# Patient Record
Sex: FEMALE | Race: BLACK OR AFRICAN AMERICAN | NOT HISPANIC OR LATINO | Employment: UNEMPLOYED | URBAN - METROPOLITAN AREA
[De-identification: names, ages, dates, MRNs, and addresses within clinical notes are randomized per-mention and may not be internally consistent; named-entity substitution may affect disease eponyms.]

---

## 2021-10-08 ENCOUNTER — MEDICAL CORRESPONDENCE (OUTPATIENT)
Dept: HEALTH INFORMATION MANAGEMENT | Facility: CLINIC | Age: 31
End: 2021-10-08

## 2021-10-08 ENCOUNTER — TRANSFERRED RECORDS (OUTPATIENT)
Dept: HEALTH INFORMATION MANAGEMENT | Facility: CLINIC | Age: 31
End: 2021-10-08

## 2021-10-08 ENCOUNTER — TELEPHONE (OUTPATIENT)
Dept: OPHTHALMOLOGY | Facility: CLINIC | Age: 31
End: 2021-10-08

## 2021-10-08 NOTE — TELEPHONE ENCOUNTER
Dr. Gloria Hurd at [facility] referring to Uveitis specialist    Records in care everywhere.    Spoke to referring provider and will ask patient communicator to reach out for scheduling with Dr. Monk or Dr. Quiroz in next 1-2 weeks and referring provider satisfied with plan    (first available with Dr. Monk next Tuesday)    Pt will need updated demographics-- address/telephone and likely insurance.    Note to patient communicator to assist in updating/scheduling later in day today    Best number to reach pt [phone]    Mik Howell RN 7:52 AM 10/08/21

## 2021-10-08 NOTE — TELEPHONE ENCOUNTER
Called and spoke with Joelle today.  I scheduled her for an Uveitis appt with Dr. Monk for 10/12 @ 1230 pm I verified and changed Joelle's address and sent her a new pt packet.     Sari Bates Communication Facilitator on 10/8/2021 at 10:29 AM

## 2021-10-12 ENCOUNTER — OFFICE VISIT (OUTPATIENT)
Dept: OPHTHALMOLOGY | Facility: CLINIC | Age: 31
End: 2021-10-12
Attending: OPHTHALMOLOGY
Payer: COMMERCIAL

## 2021-10-12 DIAGNOSIS — H21.233 PIGMENT DISPERSION SYNDROME OF BOTH EYES: ICD-10-CM

## 2021-10-12 DIAGNOSIS — H20.00 ACUTE ANTERIOR UVEITIS OF BOTH EYES: Primary | ICD-10-CM

## 2021-10-12 PROCEDURE — G0463 HOSPITAL OUTPT CLINIC VISIT: HCPCS

## 2021-10-12 PROCEDURE — 99204 OFFICE O/P NEW MOD 45 MIN: CPT | Performed by: OPHTHALMOLOGY

## 2021-10-12 RX ORDER — CYCLOBENZAPRINE HCL 10 MG
TABLET ORAL PRN
COMMUNITY
Start: 2020-11-23

## 2021-10-12 RX ORDER — PREDNISOLONE ACETATE 10 MG/ML
1 SUSPENSION/ DROPS OPHTHALMIC
COMMUNITY
Start: 2021-09-20

## 2021-10-12 RX ORDER — ATROPINE SULFATE 10 MG/ML
1 SOLUTION/ DROPS OPHTHALMIC AT BEDTIME
COMMUNITY
Start: 2021-08-10

## 2021-10-12 ASSESSMENT — VISUAL ACUITY
OS_SC+: -2
OD_SC: 20/20
METHOD: SNELLEN - LINEAR
OS_SC: 20/20

## 2021-10-12 ASSESSMENT — CONF VISUAL FIELD
OS_NORMAL: 1
OD_NORMAL: 1
METHOD: COUNTING FINGERS

## 2021-10-12 ASSESSMENT — TONOMETRY
OS_IOP_MMHG: 15
IOP_METHOD: APPLANATION
OD_IOP_MMHG: 12
OD_IOP_MMHG: 15
OS_IOP_MMHG: 17
IOP_METHOD: TONOPEN

## 2021-10-12 ASSESSMENT — CUP TO DISC RATIO
OD_RATIO: 0.15
OS_RATIO: 0.15

## 2021-10-12 ASSESSMENT — SLIT LAMP EXAM - LIDS
COMMENTS: MAKEUP DEBRIS
COMMENTS: MAKEUP DEBRIS

## 2021-10-12 ASSESSMENT — EXTERNAL EXAM - RIGHT EYE: OD_EXAM: NORMAL

## 2021-10-12 ASSESSMENT — EXTERNAL EXAM - LEFT EYE: OS_EXAM: NORMAL

## 2021-10-12 NOTE — PROGRESS NOTES
"HPI:  Joelle Garcia is a 31 year old female presenting for uveitis evaluation.    Referred by Dr. Gloria Hurd (optom at [facility])  Reviewed notes from [facility].  7/1/21: visit with Dr. Ellis as new patient. Given 3 week course of Maxitrol for 2 months of itchy, watery eyes.  7/20/21: acute visit with Dr. Hurd for bilateral eye pain and redness starting that morning. Found to have 3+ AC cell and 1+ flare each eye and started on cyclo at bedtime and PF q1h  7/21/21: had started PF that morning and had 2 drops before appointment with 2+ AC cell right eye and 1+ left eye.   7/23/21: 1+ AC cell each eye --> tapered PF to q2h  7/30/21: quiet each eye --> stopped cyclo and tapered PF to QID  8/9/21: on PF QID noted to have \"few small cells with minimal movement\" each eye --> started atropine and increased PF to q2h  8/16/21: one cell right eye and few cells left eye --> continued on PF q2h  8/23/21: \"1+ fine cells only seen on high beam\" in each eye while on PF q2h --> referred to [facility]  9/20/21: follow up after seeing [facility] Dr. Salazar who stopped atropine and tapered PF to QID right eye; found to have 2+ AC cell right eye and 1+ left eye on PF QID right eye And q2h left eye but asymptomatic --> increased PF to q2h each eye and referred to rheum  9/23/21: visit with Dr. 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     Prior workup:  Normal/negative: BMP, CBC, RF, Treponemal Ab, LONNIE, HIV, CMV IgM, ANCA, TSH, CCP, Sjogren's Ab, B27, ACE, CXR  CRP 1.43 and ESR 30 in July 2021    She states she felt fine when she went to sleep but woke up at 6am to pray for Elizabeth. When she woke up she had bilateral eye pain and a lot of light sensitivity. Went in for same day appointment and " found to have inflammation in both eyes.  Symptoms resolved with steroid drops. Now eyes feel fine but she is told she still has some inflammation.  Using PF currently every 2 hours (8-9x/day during the week and about 7x/day on weekend)  Stopped dilating drop to be able to see at near.  She states she was told at her last appointment on 10/8 that she should restart the dilating drop but she didn't want to because of near focus.    Review Of Systems  Skin: no rashes, no oral/genital ulcers  Ears/Nose/Throat: no sore throat, no runny nose, no difficulty hearing, no tinnitus  Respiratory: no shortness of breath, no cough, no hemoptysis  Cardiovascular: no chest pain, no heart palpitations  Gastrointestinal: no nausea, no vomiting, no diarrhea, no blood/mucus in stool  Genitourinary: no pain with urination, no blood in urine  Musculoskeletal: no joint pains/swelling  Neurologic: no numbness, no tingling, no weakness, no headaches, no difficulty with speech/gait  Hematologic/Lymphatic/Immunologic: no easy bleeding/bruising  Endocrine: no weight loss; has gained 60 pounds over the past 2 years  Const: no fevers, no chills    Past Ocular History:  Bilateral iritis - acute onset 7/20/21    PMH:  +PPD in [place] when [place] from [facility] - had 9 months tx for LTBI    SH:  Just got new job as  at [employer], was previously working at a .  Born in [facility], moved to [place] in 2005, then to [place] in 2010, then to [place] in 2015, and back to [place] in 2019.  Never smoker. No EtOH, no illicits.  No recent travel.  Has 3 kids (14, 11, 10) who are all healthy. No pets.    FH:  No known family history of blindness, glaucoma, macular degeneration. No known FHx of autoimmune/inflammatory disease.    ASSESSMENT and PLAN:  1. Acute anterior uveitis of both eyes  - by history with acute anterior uveitis both eyes starting 7/20/21 with eye redness and photophobia that resolved with topical PF  - +PPD in 2005 upon  immigration [place] from [place]; treated with 9 months of LTBI therapy  - workup unremarkable including negative syphilis, ACE, CXR, B27  - ROS negative  - quiet on exam today while on PF q2h    -- discussed diagnosis of uveitis and that 50% of uveitis is idiopathic  -- discussed risk of longterm prednisolone and would like to taper off therapy    --> taper PF to QID x1 week, then TID x1 week, then BID until follow up  --> follow up in 3-4 weeks or sooner if symptoms recur    --> discussed if remains quiet with taper will plan to monitor without treatment; if unable to taper off drops or flaring >3x/year would discuss longterm IMT; she understands    Letter to Dr. Hurd       2. Pigment dispersion syndrome of both eyes  - fine pigment in anterior chamber of each eye without active inflammation  - no TIDs, no K spindle  - angles are open and densely pigmented especially inferiorly  - IOP OK and healthy nerves  - monitor      Follow up in 3-4 weeks no dilation, or sooner PRN        -----------------------------------------------------------------------------------    Attestation:  Complete documentation of historical and exam elements from today's encounter can be found in the full encounter summary report (not reduplicated in this progress note). I personally obtained the chief complaint(s) and history of present illness.  I confirmed and edited as necessary the review of systems, past medical/surgical history, family history, social history, and examination findings as documented by others; and I examined the patient myself. I personally reviewed the relevant tests, images, and reports as documented above.     I formulated and edited as necessary the assessment and plan and discussed the findings and management plan with the patient and family.      Kalyn Monk MD

## 2021-10-12 NOTE — PATIENT INSTRUCTIONS
OK to stay off the red top drop    Taper the prednisolone drop to 4 times a day for 1 week  Then to 3 times a day for 1 week  Then to 2 times a day    Follow up in 3-4 weeks    If the pain returns as you are decreasing the drops, call for a sooner appointment and continue taking the drops

## 2021-10-12 NOTE — NURSING NOTE
Chief Complaints and History of Present Illnesses   Patient presents with     Uveitis Evaluation     Chief Complaint(s) and History of Present Illness(es)     Uveitis Evaluation               Comments     Pt states vision has been good lately. Pt does not wear glasses or contacts.  No eye pain today. No flashes or floaters. No redness or dryness.    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    ADRIANO Aguirre October 12, 2021 12:31 PM

## 2021-11-02 ENCOUNTER — OFFICE VISIT (OUTPATIENT)
Dept: OPHTHALMOLOGY | Facility: CLINIC | Age: 31
End: 2021-11-02
Attending: OPHTHALMOLOGY
Payer: COMMERCIAL

## 2021-11-02 DIAGNOSIS — H20.00 ACUTE ANTERIOR UVEITIS OF BOTH EYES: Primary | ICD-10-CM

## 2021-11-02 DIAGNOSIS — H04.129 DRY EYE: ICD-10-CM

## 2021-11-02 DIAGNOSIS — H21.233 PIGMENT DISPERSION SYNDROME OF BOTH EYES: ICD-10-CM

## 2021-11-02 PROCEDURE — 99213 OFFICE O/P EST LOW 20 MIN: CPT | Performed by: OPHTHALMOLOGY

## 2021-11-02 PROCEDURE — G0463 HOSPITAL OUTPT CLINIC VISIT: HCPCS

## 2021-11-02 ASSESSMENT — SLIT LAMP EXAM - LIDS
COMMENTS: MAKEUP DEBRIS
COMMENTS: MAKEUP DEBRIS

## 2021-11-02 ASSESSMENT — CONF VISUAL FIELD
METHOD: COUNTING FINGERS
OD_NORMAL: 1
OS_NORMAL: 1

## 2021-11-02 ASSESSMENT — EXTERNAL EXAM - RIGHT EYE: OD_EXAM: NORMAL

## 2021-11-02 ASSESSMENT — TONOMETRY
IOP_METHOD: TONOPEN
OS_IOP_MMHG: 14
OD_IOP_MMHG: 13

## 2021-11-02 ASSESSMENT — VISUAL ACUITY
OD_SC+: -1
OS_PH_SC: 20/25
METHOD: SNELLEN - LINEAR
OS_SC: 20/30
OD_SC: 20/20

## 2021-11-02 ASSESSMENT — EXTERNAL EXAM - LEFT EYE: OS_EXAM: NORMAL

## 2021-11-02 NOTE — NURSING NOTE
Chief Complaints and History of Present Illnesses   Patient presents with     Uveitis Follow-Up     Acute anterior uveitis of both eyes     Chief Complaint(s) and History of Present Illness(es)     Uveitis Follow-Up     Laterality: both eyes    Course: stable    Associated symptoms: itching and burning.  Negative for eye pain    Treatments tried: eye drops    Pain scale: 0/10    Comments: Acute anterior uveitis of both eyes              Comments     She states that for the past 5 days both eyes have been itchy and burning.  When she scratches her eyes then they get red.  Her vision has seemed stable in both eyes since her last exam.    She is using PF daily in both eyes.    Yoana Marte, COT 2:18 PM  November 2, 2021

## 2021-11-02 NOTE — PROGRESS NOTES
HPI:  Joelle Garcia is a 31 year old female presenting for follow up of anterior uveitis.    Last visit 10/12/21 with quiet eyes with AC pigment on PF QID.  Tapered PF to TID then to BID.    Today, she reports she is down to once a day. Used QID for a week after last visit, then TID for 1 week, BID until yesterday, once today. Has also stopped dilating drop. No changes in vision. No eye pain. Some itchiness.  No rashes, no joint pains, no difficulty breathing.  No return of prior iritis symptoms.    Past Ocular History:  Bilateral iritis - acute onset 7/20/21    PMH:  +PPD in 2005 when moved to  from [facility] - had 9 months tx for LTBI    SH:  Just got new job as  at [employer], was previously working at a .  Born in [facility], moved to [place] in 2005, then to [place] in 2010, then to [place] in 2015, and back to [place] in 2019.  Never smoker. No EtOH, no illicits.  No recent travel.  Has 3 kids (14, 11, 10) who are all healthy. No pets.    FH:  No known family history of blindness, glaucoma, macular degeneration. No known FHx of autoimmune/inflammatory disease.    ASSESSMENT and PLAN:  1. Acute anterior uveitis of both eyes  - by history with acute anterior uveitis both eyes starting 7/20/21 with eye redness and photophobia that resolved with topical PF  - +PPD in 2005 upon immigration to  from [facility]; treated with 9 months of LTBI therapy  - workup unremarkable including negative syphilis, ACE, CXR, B27  - ROS negative  - quiet on exam today while on PF daily (decreased from BID yesterday)    --> taper PF to daily x1 week, then stop  --> follow up in 6 weeks or sooner if symptoms recur    --> discussed if remains quiet with taper will plan to monitor without treatment; if unable to taper off drops or flaring >3x/year would discuss longterm IMT; she understands      2. Pigment dispersion syndrome of both eyes  - fine pigment in anterior chamber of each eye without active inflammation  - no TIDs,  no K spindle  - angles are open and densely pigmented especially inferiorly  - IOP OK and healthy nerves  - monitor    3. Dry eye  - discussed ATs and warm compresses prn      Follow up in 6 weeks no dilation, or sooner PRN        -----------------------------------------------------------------------------------    Attestation:  Complete documentation of historical and exam elements from today's encounter can be found in the full encounter summary report (not reduplicated in this progress note). I personally obtained the chief complaint(s) and history of present illness.  I confirmed and edited as necessary the review of systems, past medical/surgical history, family history, social history, and examination findings as documented by others; and I examined the patient myself. I personally reviewed the relevant tests, images, and reports as documented above.     I formulated and edited as necessary the assessment and plan and discussed the findings and management plan with the patient and family.      Kalyn Monk MD

## 2021-11-02 NOTE — PATIENT INSTRUCTIONS
Continue prednisolone 1 time a day in both eyes for 1 more week, then stop    Start artificial tears as needed for dryness/itchiness    Follow up in 6 weeks or sooner if changes

## 2021-11-10 ENCOUNTER — TELEPHONE (OUTPATIENT)
Dept: OPHTHALMOLOGY | Facility: CLINIC | Age: 31
End: 2021-11-10
Payer: COMMERCIAL

## 2021-11-10 DIAGNOSIS — H04.129 DRY EYE: ICD-10-CM

## 2021-11-10 NOTE — TELEPHONE ENCOUNTER
Rx updated with comment to pharmacy Genteal, Systane, Refresh or equivalent ok to dispense and triage number for questions    Mik Howell RN 9:01 AM 11/10/21          [facility] Call Center    Phone Message    May a detailed message be left on voicemail: yes     Reason for Call: Medication Question or concern regarding medication   Prescription Clarification  Name of Medication: hydroxypropyl methylcellulose (GENTEAL) 0.2 % SOLN ophthalmic solution  Prescribing Provider: Dr ramsay   Pharmacy: [facility]   What on the order needs clarification? The pharmacist that they don't the ingredients needed for this prescription. Please call the pharmacist to discuss. Thanks.          Action Taken: Message routed to:  [facility]    Travel Screening: Not Applicable

## 2021-12-14 ENCOUNTER — OFFICE VISIT (OUTPATIENT)
Dept: OPHTHALMOLOGY | Facility: CLINIC | Age: 31
End: 2021-12-14
Attending: OPHTHALMOLOGY
Payer: COMMERCIAL

## 2021-12-14 DIAGNOSIS — H04.129 DRY EYE: ICD-10-CM

## 2021-12-14 DIAGNOSIS — H21.233 PIGMENT DISPERSION SYNDROME OF BOTH EYES: ICD-10-CM

## 2021-12-14 DIAGNOSIS — H20.00 ACUTE ANTERIOR UVEITIS OF BOTH EYES: Primary | ICD-10-CM

## 2021-12-14 PROCEDURE — G0463 HOSPITAL OUTPT CLINIC VISIT: HCPCS

## 2021-12-14 PROCEDURE — 99214 OFFICE O/P EST MOD 30 MIN: CPT | Performed by: OPHTHALMOLOGY

## 2021-12-14 ASSESSMENT — VISUAL ACUITY
OS_SC+: -2
OS_SC: 20/20
OD_SC: 20/20
METHOD: SNELLEN - LINEAR

## 2021-12-14 ASSESSMENT — EXTERNAL EXAM - RIGHT EYE: OD_EXAM: NORMAL

## 2021-12-14 ASSESSMENT — SLIT LAMP EXAM - LIDS
COMMENTS: MILD MGD
COMMENTS: MILD MGD

## 2021-12-14 ASSESSMENT — CONF VISUAL FIELD
OS_NORMAL: 1
OD_NORMAL: 1
METHOD: COUNTING FINGERS

## 2021-12-14 ASSESSMENT — TONOMETRY
OD_IOP_MMHG: 13
IOP_METHOD: TONOPEN
OS_IOP_MMHG: 14

## 2021-12-14 ASSESSMENT — EXTERNAL EXAM - LEFT EYE: OS_EXAM: NORMAL

## 2021-12-14 NOTE — PROGRESS NOTES
HPI:  Joelle Garcia is a 31 year old female presenting for follow up of anterior uveitis.    Last visit 11/2/21 with quiet eyes with AC pigment on PF daily.  Tapered off drops.    Today, eyes are back to normal. No issues with vision. No eye pain. No flashes/floaters. No rashes, fevers, difficulty breathing, joint pains. Itchy/dry eyes improved with prn ATs and WCs    Past Ocular History:  Bilateral iritis - acute onset 7/20/21    PMH:  +PPD in [place] when [place] from [facility] - had 9 months tx for LTBI    SH:  Just got new job as  at [employer], was previously working at a .  Born in [facility], moved to [place] in 2005, then to [place] in 2010, then to [place] in 2015, and back to [place] in 2019.  Never smoker. No EtOH, no illicits.  No recent travel.  Has 3 kids (14, 11, 10) who are all healthy. No pets.    FH:  No known family history of blindness, glaucoma, macular degeneration. No known FHx of autoimmune/inflammatory disease.    ASSESSMENT and PLAN:  1. Acute anterior uveitis of both eyes  - by history with acute anterior uveitis both eyes starting 7/20/21 with eye redness and photophobia that resolved with topical PF  - +PPD in 2005 upon immigration to  from [facility]; treated with 9 months of LTBI therapy  - workup unremarkable including negative syphilis, ACE, CXR, B27  - ROS negative  - quiet on exam today off drops for the past month    --> stay off PF; discussed to call if flaring and would restart drops if needed but monitor off treatment for now    --> discussed if remains quiet will plan to monitor without treatment; if unable to taper off drops or flaring >3x/year would discuss longterm IMT; she understands    Follow up in 3-4 months or sooner if inflammation flares      2. Pigment dispersion syndrome of both eyes  - fine pigment in anterior chamber of each eye without active inflammation  - no TIDs, no K spindle  - angles are open and densely pigmented especially inferiorly  - IOP OK  and healthy nerves  - monitor    3. Dry eye  - continue ATs and warm compresses prn      Follow up in 3-4 months no dilation, or sooner PRN        -----------------------------------------------------------------------------------    Attestation:  Complete documentation of historical and exam elements from today's encounter can be found in the full encounter summary report (not reduplicated in this progress note). I personally obtained the chief complaint(s) and history of present illness.  I confirmed and edited as necessary the review of systems, past medical/surgical history, family history, social history, and examination findings as documented by others; and I examined the patient myself. I personally reviewed the relevant tests, images, and reports as documented above.     I formulated and edited as necessary the assessment and plan and discussed the findings and management plan with the patient and family.      Kalyn Monk MD

## 2021-12-14 NOTE — NURSING NOTE
Chief Complaints and History of Present Illnesses   Patient presents with     Uveitis Follow-Up     6 week follow up Acute anterior uveitis of both eyes     Chief Complaint(s) and History of Present Illness(es)     Uveitis Follow-Up     Comments: 6 week follow up Acute anterior uveitis of both eyes              Comments     Pt states vision has improved since last visit. No eye pain today.  No flashes or floaters. No redness or dryness.  Some tearing from both eyes, after stopping PF.    ADRIANO Aguirre December 14, 2021 7:51 AM

## 2022-04-18 ENCOUNTER — OFFICE VISIT (OUTPATIENT)
Dept: OPHTHALMOLOGY | Facility: CLINIC | Age: 32
End: 2022-04-18
Attending: OPHTHALMOLOGY
Payer: COMMERCIAL

## 2022-04-18 DIAGNOSIS — H20.00 ACUTE ANTERIOR UVEITIS OF BOTH EYES: Primary | ICD-10-CM

## 2022-04-18 DIAGNOSIS — H10.13 ALLERGIC CONJUNCTIVITIS, BILATERAL: ICD-10-CM

## 2022-04-18 DIAGNOSIS — H04.129 DRY EYE: ICD-10-CM

## 2022-04-18 DIAGNOSIS — H21.233 PIGMENT DISPERSION SYNDROME OF BOTH EYES: ICD-10-CM

## 2022-04-18 PROCEDURE — 92012 INTRM OPH EXAM EST PATIENT: CPT | Performed by: OPHTHALMOLOGY

## 2022-04-18 PROCEDURE — G0463 HOSPITAL OUTPT CLINIC VISIT: HCPCS

## 2022-04-18 ASSESSMENT — TONOMETRY
OD_IOP_MMHG: 14
IOP_METHOD: ICARE
OS_IOP_MMHG: 10

## 2022-04-18 ASSESSMENT — CUP TO DISC RATIO
OD_RATIO: 0.15
OS_RATIO: 0.15

## 2022-04-18 ASSESSMENT — EXTERNAL EXAM - LEFT EYE: OS_EXAM: NORMAL

## 2022-04-18 ASSESSMENT — VISUAL ACUITY
OS_PH_CC+: -2
OD_CC: 20/20
OD_CC+: -1
METHOD: SNELLEN - LINEAR
OS_PH_CC: 20/20
OS_CC+: -1
OS_CC: 20/25

## 2022-04-18 ASSESSMENT — CONF VISUAL FIELD
OD_NORMAL: 1
METHOD: COUNTING FINGERS
OS_NORMAL: 1

## 2022-04-18 ASSESSMENT — SLIT LAMP EXAM - LIDS
COMMENTS: MILD MGD
COMMENTS: MILD MGD

## 2022-04-18 ASSESSMENT — EXTERNAL EXAM - RIGHT EYE: OD_EXAM: NORMAL

## 2022-04-18 NOTE — NURSING NOTE
Chief Complaints and History of Present Illnesses   Patient presents with     Follow Up     Acute anterior uveitis of both eyes      Chief Complaint(s) and History of Present Illness(es)     Follow Up     Pain scale: 0/10    Comments: Acute anterior uveitis of both eyes               Comments     Pt states no change in VA since last visit  C/o itching and watering BE , Same as last visit  No redness or burning BE    Haley Dorsey COT 12:24 PM April 18, 2022         [email or web address]

## 2022-04-18 NOTE — PROGRESS NOTES
HPI:  Joelle Garcia is a 32 year old female presenting for follow up of anterior uveitis.    Last visit 12/14/21 quiet off PF. Planned follow up in 3-4 months.    Eyes have been itchy and watering. Ongoing allergy symptoms. PCP gave her Pataday, PO Zyrtec, and Flonase nasal spray. Does not seem to be helping eye itching. Also had runny, itchy nose, but now just eyes, feels a little better and nasal symptoms all better. Feels like she has itching and irritation along her eyelids.  Vision is fine. No eye pain. No flare symptoms.     Past Ocular History:  Bilateral iritis - acute onset 7/20/21    PMH:  +PPD in [place] when [place] from [facility] - had 9 months tx for LTBI    SH:  Just got new job as  at [employer], was previously working at a .  Born in [facility], moved to [place] in 2005, then to [place] in 2010, then to [place] in 2015, and back to [place] in 2019.  Never smoker. No EtOH, no illicits.  No recent travel.  Has 3 kids (14, 11, 10) who are all healthy. No pets.    FH:  No known family history of blindness, glaucoma, macular degeneration. No known FHx of autoimmune/inflammatory disease.    ASSESSMENT and PLAN:  1. Acute anterior uveitis of both eyes  - by history with acute anterior uveitis both eyes starting 7/20/21 with eye redness and photophobia that resolved with topical PF  - +PPD in 2005 upon immigration to  from [facility]; treated with 9 months of LTBI therapy  - workup unremarkable including negative syphilis, ACE, CXR, B27  - ROS negative  - quiet on exam today off drops    --> stay off PF; discussed to call if flaring and would restart drops if needed but monitor off treatment for now    --> discussed if remains quiet will plan to monitor without treatment; if unable to taper off drops or flaring >3x/year would discuss longterm IMT; she understands    Follow up in 6 months or sooner if inflammation flares      2. Pigment dispersion syndrome of both eyes  - pigment in anterior chamber  of each eye without active inflammation  - no TIDs, no K spindle  - angles are open and densely pigmented especially inferiorly  - IOP OK and healthy nerves  - monitor    3. Dry eye  4. Allergic conjunctivitis  - discussed chilled ATs  --> start warm compresses  --> continue ongoing allergy treatment  (Zyrtec, Pataday) as has been somewhat helpful and relieved nasal symptoms    Follow up in 6 months with dilation, or sooner PRN        -----------------------------------------------------------------------------------    Attestation:  Complete documentation of historical and exam elements from today's encounter can be found in the full encounter summary report (not reduplicated in this progress note). I personally obtained the chief complaint(s) and history of present illness.  I confirmed and edited as necessary the review of systems, past medical/surgical history, family history, social history, and examination findings as documented by others; and I examined the patient myself. I personally reviewed the relevant tests, images, and reports as documented above.     I formulated and edited as necessary the assessment and plan and discussed the findings and management plan with the patient and family.      Kalyn Monk MD